# Patient Record
Sex: FEMALE | URBAN - METROPOLITAN AREA
[De-identification: names, ages, dates, MRNs, and addresses within clinical notes are randomized per-mention and may not be internally consistent; named-entity substitution may affect disease eponyms.]

---

## 2017-04-27 ENCOUNTER — THERAPY VISIT (OUTPATIENT)
Dept: PHYSICAL THERAPY | Facility: CLINIC | Age: 52
End: 2017-04-27
Payer: COMMERCIAL

## 2017-04-27 DIAGNOSIS — M54.6 PAIN IN THORACIC SPINE: Primary | ICD-10-CM

## 2017-04-27 PROCEDURE — 97110 THERAPEUTIC EXERCISES: CPT | Mod: GP | Performed by: PHYSICAL THERAPIST

## 2017-04-27 PROCEDURE — 97161 PT EVAL LOW COMPLEX 20 MIN: CPT | Mod: GP | Performed by: PHYSICAL THERAPIST

## 2017-04-27 NOTE — LETTER
[facility]  [address]  [place] [address]  [phone]    May 2, 2017    Re: Nydia Denny   :   1965  MRN:  [id number]   REFERRING PHYSICIAN:   Jae Garcia    [facility] ATHLETIC [facility] PHYSICAL THERAPY    Date of Initial Evaluation:  2017  Visits:  Rxs Used: 1  Reason for Referral:  Pain in thoracic spine    EVALUATION SUMMARY    Subjective:    Patient is a 51 year old female presenting with rehab cervical spine hpi.   Nydia Denny is a 51 year old female with a thoracic spine condition.  Condition occurred with:  Insidious onset.  Condition occurred: at home.  This is a new condition  Pt presents to PT with complaints of left chest pain that radiates to her back around her shoulder blade.  She notes that the pain feels like it passes straight through her chest to her back.  She also notes some pain in her upper left arm.  This pain began on 4-.  She does not recall a specific injury at that time.  She notes increased pain with pushing and pulling as well as lifting. She also notes increased pain with reaching overhead. 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 (scheduled to go back on 2017).  She has been using otc nsaids and pain medication to manage pain.   .    Patient reports pain:  Thoracic left side and other (ribs).    Pain is described as aching and sharp and is intermittent and reported as 6/10.  Associated symptoms:  Loss of motion/stiffness and loss of strength.   Symptoms are exacerbated by lifting and other (pushing/pulling/reaching ovehread) and relieved by NSAID's.  Since onset symptoms are gradually improving.  Special tests:  X-ray, MRI and other (ekg).      General health as reported by patient is good.  Pertinent medical history includes:  High blood pressure.  Medical allergies: yes (ibuprofen).     Current medications:  High blood pressure medication.  Current occupation is Nursing assistant.  Patient is currently not working due to present treatment problem.  Primary job tasks include:  Lifting and repetitive tasks.  Barriers include:  None as reported by the patient.  Red flags:  None as reported by the patient.                    Cervical/Thoracic Evaluation  AROM:  AROM Cervical:  Flexion:          WNL   Extension:       WNL  Rotation:         Left: 90% ERP     Right: WNL  Side Bend:      Left:     Right:   AROM Thoracic:  Flexion:               WNL ERP  Extension:          90% ERP  Rotation:            Left: 75% (+)     Right: WNL    Headaches: none  Cervical Myotomes:  normal  DTR's:  not assessed  Cervical Dermatomes:  normal  Cervical Palpation:  : Palpable pain in interspace between ribs 3-4 and 4-5.  Also palpable pain in attachment of ribs 3-5 to thoracic vertebrae.  Cord Sign:  not assessed  Shoulder Evaluation:  ROM:  AROM:  normal (Pain with flexion >abducdtion)  Strength:  normal  Palpation:  normal    Assessment/Plan:    Patient is a 51 year old female with thoracic complaints.    Patient has the following significant findings with corresponding treatment plan.                Diagnosis 1:  Rib dysfunction (3-5)  Pain -  manual therapy, self management, education, directional preference exercise and home program  Decreased ROM/flexibility - manual therapy and therapeutic exercise  Decreased joint mobility - manual therapy and therapeutic exercise  Decreased strength - therapeutic exercise and therapeutic activities  Decreased function - therapeutic activities                                    Therapy Evaluation Codes:   1) History comprised of:   Personal factors that impact the plan of care:      None.    Comorbidity factors that impact the plan of care are:      None.     Medications impacting care: None.  2) Examination of Body Systems comprised of:   Body structures and functions that  impact the plan of care:      Cervical spine, Shoulder and Thoracic Spine.   Activity limitations that impact the plan of care are:      Grasping, Lifting and Working.  3) Clinical presentation characteristics are:   Stable/Uncomplicated.  4) Decision-Making    Low complexity using standardized patient assessment instrument and/or measureable assessment of functional outcome.  Cumulative Therapy Evaluation is: Low complexity.  Previous and current functional limitations:  (See Goal Flow Sheet for this information)    Short term and Long term goals: (See Goal Flow Sheet for this information)   Communication ability:  Patient appears to be able to clearly communicate and understand verbal and written communication and follow directions correctly.  Treatment Explanation - The following has been discussed with the patient:   RX ordered/plan of care  Anticipated outcomes  Possible risks and side effects  This patient would benefit from PT intervention to resume normal activities.   Rehab potential is good.    Frequency:  1 X week, once daily  Duration:  for 6 weeks  Discharge Plan:  Achieve all LTG.  Independent in home treatment program.  Reach maximal therapeutic benefit.            Thank you for your referral.    INQUIRIES  Therapist: Vitor Acosta, [facility]  [facility] PHYSICAL THERAPY  [address]  [place] [address]  Phone: [phone]  Fax: [phone]

## 2017-04-27 NOTE — PROGRESS NOTES
Subjective:    Patient is a 51 year old female presenting with rehab cervical spine hpi.   Nydia Denny is a 51 year old female with a thoracic spine condition.  Condition occurred with:  Insidious onset.  Condition occurred: at home.  This is a new condition  Pt presents to PT with complaints of left chest pain that radiates to her back around her shoulder blade.  She notes that the pain feels like it passes straight through her chest to her back.  She also notes some pain in her upper left arm.  This pain began on 4-.  She does not recall a specific injury at that time.  She notes increased pain with pushing and pulling as well as lifting. She also notes increased pain with reaching overhead. 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 (scheduled to go back on 5-1-2017).  She has been using otc nsaids and pain medication to manage pain.   .    Patient reports pain:  Thoracic left side and other (ribs).    Pain is described as aching and sharp and is intermittent and reported as 6/10.  Associated symptoms:  Loss of motion/stiffness and loss of strength.   Symptoms are exacerbated by lifting and other (pushing/pulling/reaching ovehread) and relieved by NSAID's.  Since onset symptoms are gradually improving.  Special tests:  X-ray, MRI and other (ekg).      General health as reported by patient is good.  Pertinent medical history includes:  High blood pressure.  Medical allergies: yes (ibuprofen).    Current medications:  High blood pressure medication.  Current occupation is Nursing assistant.  Patient is currently not working due to present treatment problem.  Primary job tasks include:  Lifting and repetitive tasks.    Barriers include:  None as reported by the patient.    Red flags:  None as reported by the patient.                        Objective:    System              Cervical/Thoracic Evaluation    AROM:  AROM Cervical:    Flexion:          WNL    Extension:       WNL  Rotation:         Left: 90% ERP     Right: WNL  Side Bend:      Left:     Right:   AROM Thoracic:    Flexion:               WNL ERP  Extension:          90% ERP  Rotation:            Left: 75% (+)     Right: WNL      Headaches: none  Cervical Myotomes:  normal                  DTR's:  not assessed          Cervical Dermatomes:  normal                    Cervical Palpation:  : Palpable pain in interspace between ribs 3-4 and 4-5.  Also palpable pain in attachment of ribs 3-5 to thoracic vertebrae.            Cord Sign:  not assessed         Shoulder Evaluation:  ROM:  AROM:  normal (Pain with flexion >abducdtion)                                  Strength:  normal                          Palpation:  normal                                         General     ROS    Assessment/Plan:      Patient is a 51 year old female with thoracic complaints.    Patient has the following significant findings with corresponding treatment plan.                Diagnosis 1:  Rib dysfunction (3-5)    Pain -  manual therapy, self management, education, directional preference exercise and home program  Decreased ROM/flexibility - manual therapy and therapeutic exercise  Decreased joint mobility - manual therapy and therapeutic exercise  Decreased strength - therapeutic exercise and therapeutic activities  Decreased function - therapeutic activities    Therapy Evaluation Codes:   1) History comprised of:   Personal factors that impact the plan of care:      None.    Comorbidity factors that impact the plan of care are:      None.     Medications impacting care: None.  2) Examination of Body Systems comprised of:   Body structures and functions that impact the plan of care:      Cervical spine, Shoulder and Thoracic Spine.   Activity limitations that impact the plan of care are:      Grasping, Lifting and Working.  3) Clinical presentation characteristics are:   Stable/Uncomplicated.  4) Decision-Making    Low  complexity using standardized patient assessment instrument and/or measureable assessment of functional outcome.  Cumulative Therapy Evaluation is: Low complexity.    Previous and current functional limitations:  (See Goal Flow Sheet for this information)    Short term and Long term goals: (See Goal Flow Sheet for this information)     Communication ability:  Patient appears to be able to clearly communicate and understand verbal and written communication and follow directions correctly.  Treatment Explanation - The following has been discussed with the patient:   RX ordered/plan of care  Anticipated outcomes  Possible risks and side effects  This patient would benefit from PT intervention to resume normal activities.   Rehab potential is good.    Frequency:  1 X week, once daily  Duration:  for 6 weeks  Discharge Plan:  Achieve all LTG.  Independent in home treatment program.  Reach maximal therapeutic benefit.    Please refer to the daily flowsheet for treatment today, total treatment time and time spent performing 1:1 timed codes.

## 2017-04-27 NOTE — MR AVS SNAPSHOT
"              After Visit Summary   2017    Nydia Denny    MRN: [id number]           Patient Information     Date Of Birth          1965        Visit Information        Provider Department      2017 9:20 AM Vitor Acosta PT [facility]        Today's Diagnoses     Pain in thoracic spine    -  1       Follow-ups after your visit        Who to contact     If you have questions or need follow up information about today's clinic visit or your schedule please contact [facility] directly at [phone].  Normal or non-critical lab and imaging results will be communicated to you by Acceptdhart, letter or phone within 4 business days after the clinic has received the results. If you do not hear from us within 7 days, please contact the clinic through Amoobit or phone. If you have a critical or abnormal lab result, we will notify you by phone as soon as possible.  Submit refill requests through Feniks or call your pharmacy and they will forward the refill request to us. Please allow 3 business days for your refill to be completed.          Additional Information About Your Visit        MyChart Information     Feniks lets you send messages to your doctor, view your test results, renew your prescriptions, schedule appointments and more. To sign up, go to [email or web address] . Click on \"Log in\" on the left side of the screen, which will take you to the Welcome page. Then click on \"Sign up Now\" on the right side of the page.     You will be asked to enter the access code listed below, as well as some personal information. Please follow the directions to create your username and password.     Your access code is: [email or web address]  Expires: 2017 12:34 PM     Your access code will  in 90 days. If you need help or a new code, please call your [place] clinic or [phone].        Care " EveryWhere ID     This is your Care EveryWhere ID. This could be used by other organizations to access your [place] medical records  [id number]         Blood Pressure from Last 3 Encounters:   09/05/06 110/80   08/14/06 112/68   06/19/06 106/64    Weight from Last 3 Encounters:   09/05/06 78.5 kg (173 lb)   06/19/06 77.6 kg (171 lb)              We Performed the Following     HC PT EVAL, LOW COMPLEXITY     CHRIS INITIAL EVAL REPORT     THERAPEUTIC EXERCISES        Primary Care Provider    None Specified       No primary provider on file.        Thank you!     Thank you for choosing [place] [facility]TH[facility]IC [facility]IC[facility] PHYSICAL THERAPY  for your care. Our goal is always to provide you with excellent care. Hearing back from our patients is one way we can continue to improve our services. Please take a few minutes to complete the written survey that you may receive in the mail after your visit with us. Thank you!             Your Updated Medication List - Protect others around you: Learn how to safely use, store and throw away your medicines at www.disposemymeds.org.          This list is accurate as of: 4/27/17 12:34 PM.  Always use your most recent med list.                   Brand Name Dispense Instructions for use    HYDROcodone-acetaminophen  MG per tablet    NORCO    28    1 CAPSULE EVERY 4 HOURS AS NEEDED       ibuprofen 600 MG tablet    ADVIL/MOTRIN    90    1 TABLET EVERY 6 hours as needed

## 2017-07-19 ENCOUNTER — OFFICE VISIT (OUTPATIENT)
Dept: OPTOMETRY | Facility: CLINIC | Age: 52
End: 2017-07-19
Payer: COMMERCIAL

## 2017-07-19 DIAGNOSIS — H52.203 HYPEROPIA WITH ASTIGMATISM AND PRESBYOPIA, BILATERAL: Primary | ICD-10-CM

## 2017-07-19 DIAGNOSIS — H25.13 NUCLEAR SCLEROSIS OF BOTH EYES: ICD-10-CM

## 2017-07-19 DIAGNOSIS — H52.03 HYPEROPIA WITH ASTIGMATISM AND PRESBYOPIA, BILATERAL: Primary | ICD-10-CM

## 2017-07-19 DIAGNOSIS — H11.003 PTERYGIUM, BILATERAL: ICD-10-CM

## 2017-07-19 DIAGNOSIS — H52.4 HYPEROPIA WITH ASTIGMATISM AND PRESBYOPIA, BILATERAL: Primary | ICD-10-CM

## 2017-07-19 PROCEDURE — 92004 COMPRE OPH EXAM NEW PT 1/>: CPT | Performed by: OPTOMETRIST

## 2017-07-19 PROCEDURE — 92015 DETERMINE REFRACTIVE STATE: CPT | Performed by: OPTOMETRIST

## 2017-07-19 ASSESSMENT — VISUAL ACUITY
OS_SC+: +2
OS_SC: 20/25
OD_CC: 20/25
OD_SC: 20/25
CORRECTION_TYPE: GLASSES
OD_CC: J1+
OS_CC: 20/20
OS_CC+: -1
METHOD: SNELLEN - LINEAR

## 2017-07-19 ASSESSMENT — CONF VISUAL FIELD
METHOD: COUNTING FINGERS
OS_NORMAL: 1
OD_NORMAL: 1

## 2017-07-19 ASSESSMENT — REFRACTION_MANIFEST
OD_ADD: +2.25
OD_SPHERE: +0.00
OD_SPHERE: PLANO
METHOD_AUTOREFRACTION: 1
OS_AXIS: 175
OD_AXIS: 175
OS_CYLINDER: +0.25
OD_AXIS: 175
OS_AXIS: 176
OD_CYLINDER: +0.50
OS_ADD: +2.25
OS_SPHERE: PLANO
OS_SPHERE: +0.25
OS_CYLINDER: +0.50
OD_CYLINDER: +0.75

## 2017-07-19 ASSESSMENT — REFRACTION_WEARINGRX
OD_SPHERE: +0.25
OS_AXIS: 000
OD_CYLINDER: +0.50
OD_ADD: +2.25
OS_SPHERE: +0.50
OS_CYLINDER: +0.00
OD_AXIS: 165
OS_ADD: +2.25

## 2017-07-19 ASSESSMENT — EXTERNAL EXAM - RIGHT EYE: OD_EXAM: NORMAL

## 2017-07-19 ASSESSMENT — TONOMETRY
OS_IOP_MMHG: 18
IOP_METHOD: APPLANATION
OD_IOP_MMHG: 18

## 2017-07-19 ASSESSMENT — EXTERNAL EXAM - LEFT EYE: OS_EXAM: NORMAL

## 2017-07-19 ASSESSMENT — SLIT LAMP EXAM - LIDS
COMMENTS: NORMAL
COMMENTS: NORMAL

## 2017-07-19 ASSESSMENT — CUP TO DISC RATIO: OD_RATIO: 0.25

## 2017-07-19 NOTE — MR AVS SNAPSHOT
"              After Visit Summary   7/19/2017    Nydia Denny    MRN: [id number]           Patient Information     Date Of Birth          1965        Visit Information        Provider Department      7/19/2017 3:00 PM Mohini Penaloza OD [facility]        Today's Diagnoses     Hyperopia with astigmatism and presbyopia, bilateral    -  1    Pterygium, bilateral        Nuclear sclerosis of both eyes          Care Instructions    Keeps eyes moist with artifical tears 3 to 4 times a day. Avoid fans or point them away from your face.    Wear sunglasses when outside or riding in a car.    Return for the dilated portion of the exam.    Thank you!          Follow-ups after your visit        Who to contact     If you have questions or need follow up information about today's clinic visit or your schedule please contact [facility] directly at [phone].  Normal or non-critical lab and imaging results will be communicated to you by MyChart, letter or phone within 4 business days after the clinic has received the results. If you do not hear from us within 7 days, please contact the clinic through [employer] Grouphart or phone. If you have a critical or abnormal lab result, we will notify you by phone as soon as possible.  Submit refill requests through Electro-LuminX or call your pharmacy and they will forward the refill request to us. Please allow 3 business days for your refill to be completed.          Additional Information About Your Visit        MyChart Information     Electro-LuminX lets you send messages to your doctor, view your test results, renew your prescriptions, schedule appointments and more. To sign up, go to [email or web address] . Click on \"Log in\" on the left side of the screen, which will take you to the Welcome page. Then click on \"Sign up Now\" on the right side of the page.     You will be asked to enter the access code listed below, as well as some personal information. " Please follow the directions to create your username and password.     Your access code is: [email or web address]  Expires: 2017 12:34 PM     Your access code will  in 90 days. If you need help or a new code, please call your [place] clinic or [phone].        Care EveryWhere ID     This is your Care EveryWhere ID. This could be used by other organizations to access your [place] medical records  [id number]         Blood Pressure from Last 3 Encounters:   06 110/80   06 112/68   06 106/64    Weight from Last 3 Encounters:   06 78.5 kg (173 lb)   06 77.6 kg (171 lb)              We Performed the Following     EYE EXAM (SIMPLE-NONBILLABLE)     REFRACTION        Primary Care Provider    None Specified       No primary provider on file.        Equal Access to Services     [facility]Z [facility] : Hadjame Farley, pippa mejia, wally briseno, yesy parada . So [facility] [phone].    ATENCIÓN: Si habla español, tiene a early disposición servicios gratuitos de asistencia lingüística. Llame al [phone].    We comply with applicable federal civil rights laws and [place] laws. We do not discriminate on the basis of race, color, national origin, age, disability sex, sexual orientation or gender identity.            Thank you!     Thank you for choosing [facility]  for your care. Our goal is always to provide you with excellent care. Hearing back from our patients is one way we can continue to improve our services. Please take a few minutes to complete the written survey that you may receive in the mail after your visit with us. Thank you!             Your Updated Medication List - Protect others around you: Learn how to safely use, store and throw away your medicines at www.disposemymeds.org.          This list is accurate as of: 17 11:59 PM.  Always use your most recent med list.                   Brand Name  Dispense Instructions for use Diagnosis    HYDROcodone-acetaminophen  MG per tablet    NORCO    28    1 CAPSULE EVERY 4 HOURS AS NEEDED        ibuprofen 600 MG tablet    ADVIL/MOTRIN    90    1 TABLET EVERY 6 hours as needed

## 2017-07-19 NOTE — PROGRESS NOTES
Chief Complaint   Patient presents with     COMPREHENSIVE EYE EXAM         Last Eye Exam: 2015  Dilated Previously: No, side effects of dilation explained today.  Pt would prefer no dilation    What are you currently using to see?  Glasses. Does not use often only when she is trying to look at something tiny       Distance Vision Acuity: Satisfied with vision    Near Vision Acuity: Not satisfied     Eye Comfort: teary - dry eyes  Do you use eye drops? : Yes: OTC for dry eye  Occupation or Hobbies: nursing assistant    Marilyn Simons  [employer] [facility] [employer]            Medical, surgical and family histories reviewed and updated 7/19/2017.       OBJECTIVE: See Ophthalmology exam    ASSESSMENT:    ICD-10-CM    1. Hyperopia with astigmatism and presbyopia, bilateral H52.03     H52.203     H52.4    2. Pterygium, bilateral H11.003    3. Nuclear sclerosis of both eyes H25.13       PLAN:     Patient Instructions   Keeps eyes moist with artifical tears 3 to 4 times a day. Avoid fans or point them away from your face.    Wear sunglasses when outside or riding in a car.    Return for the dilated portion of the exam.    Thank you!

## 2017-07-20 NOTE — PATIENT INSTRUCTIONS
Keeps eyes moist with artifical tears 3 to 4 times a day. Avoid fans or point them away from your face.    Wear sunglasses when outside or riding in a car.    Return for the dilated portion of the exam.    Thank you!

## 2018-01-12 PROBLEM — M54.6 PAIN IN THORACIC SPINE: Status: RESOLVED | Noted: 2017-04-27 | Resolved: 2018-01-12

## 2021-02-18 ENCOUNTER — RECORDS - HEALTHEAST (OUTPATIENT)
Dept: LAB | Facility: CLINIC | Age: 56
End: 2021-02-18

## 2021-02-18 LAB
SARS-COV-2 PCR COMMENT: NORMAL
SARS-COV-2 RNA SPEC QL NAA+PROBE: NEGATIVE
SARS-COV-2 VIRUS SPECIMEN SOURCE: NORMAL

## 2021-02-25 ENCOUNTER — RECORDS - HEALTHEAST (OUTPATIENT)
Dept: LAB | Facility: CLINIC | Age: 56
End: 2021-02-25

## 2021-03-04 ENCOUNTER — RECORDS - HEALTHEAST (OUTPATIENT)
Dept: LAB | Facility: CLINIC | Age: 56
End: 2021-03-04

## 2021-03-11 ENCOUNTER — RECORDS - HEALTHEAST (OUTPATIENT)
Dept: LAB | Facility: CLINIC | Age: 56
End: 2021-03-11
